# Patient Record
Sex: MALE | Race: OTHER | URBAN - METROPOLITAN AREA
[De-identification: names, ages, dates, MRNs, and addresses within clinical notes are randomized per-mention and may not be internally consistent; named-entity substitution may affect disease eponyms.]

---

## 2021-01-11 ENCOUNTER — EMERGENCY (EMERGENCY)
Facility: HOSPITAL | Age: 46
LOS: 1 days | Discharge: HOME | End: 2021-01-11
Admitting: EMERGENCY MEDICINE
Payer: COMMERCIAL

## 2021-01-11 VITALS
OXYGEN SATURATION: 98 % | HEART RATE: 60 BPM | RESPIRATION RATE: 18 BRPM | TEMPERATURE: 98 F | SYSTOLIC BLOOD PRESSURE: 127 MMHG | DIASTOLIC BLOOD PRESSURE: 75 MMHG

## 2021-01-11 DIAGNOSIS — Z20.822 CONTACT WITH AND (SUSPECTED) EXPOSURE TO COVID-19: ICD-10-CM

## 2021-01-11 PROCEDURE — U0005: CPT

## 2021-01-11 PROCEDURE — U0003: CPT

## 2021-01-11 PROCEDURE — 99283 EMERGENCY DEPT VISIT LOW MDM: CPT

## 2021-01-11 NOTE — ED PROVIDER NOTE - PATIENT PORTAL LINK FT
You can access the FollowMyHealth Patient Portal offered by [facility] by registering at the following website: [email or web address]. By joining [facility]’s FollowMyHealth portal, you will also be able to view your health information using other applications (apps) compatible with our system.

## 2021-01-12 LAB — SARS-COV-2 RNA SPEC QL NAA+PROBE: SIGNIFICANT CHANGE UP

## 2022-10-12 PROBLEM — Z00.00 ENCOUNTER FOR PREVENTIVE HEALTH EXAMINATION: Status: ACTIVE | Noted: 2022-10-12

## 2022-11-10 ENCOUNTER — NON-APPOINTMENT (OUTPATIENT)
Age: 47
End: 2022-11-10

## 2022-11-10 ENCOUNTER — APPOINTMENT (OUTPATIENT)
Dept: COLORECTAL SURGERY | Facility: CLINIC | Age: 47
End: 2022-11-10

## 2022-11-10 VITALS
HEART RATE: 83 BPM | DIASTOLIC BLOOD PRESSURE: 87 MMHG | TEMPERATURE: 97.3 F | BODY MASS INDEX: 23.8 KG/M2 | WEIGHT: 170 LBS | SYSTOLIC BLOOD PRESSURE: 130 MMHG | HEIGHT: 71 IN

## 2022-11-10 DIAGNOSIS — K64.4 RESIDUAL HEMORRHOIDAL SKIN TAGS: ICD-10-CM

## 2022-11-10 PROCEDURE — 46600 DIAGNOSTIC ANOSCOPY SPX: CPT

## 2022-11-10 PROCEDURE — 99203 OFFICE O/P NEW LOW 30 MIN: CPT | Mod: 25

## 2022-11-10 NOTE — HISTORY OF PRESENT ILLNESS
[FreeTextEntry1] : 48 y/o M presents for evaluation of hemorrhoids, referred by Dr. Aragon.\par \par PMH: Denies\par PSH: Hemorrhoid removed\par Denies [facility] Colorectal CA\par Last Colonoscopy: 9/15/22, per patient s/p polypectomy benign and some other polyp more concerning for closer followup, repeat in 5 years\par Denies ASA/NSAIDs in last 7 days.\par \par Hx rubber banding x2 10 years ago and 30 years ago. c/o of extra tissue, unable to bike for long periods of time d/t pain, occasional bleeding after BM on TP, hydrocortisone cream OTC, lidocaine, tissue prolapses, patient pushes back in but it prolapses anyway. Denies itching, burning, constipation. \par \par BH: once daily, soft and formed \par Admits to adequate intake of dietary fiber and water.\par Denies use of stool softeners and fiber supplements.\par \par \par \par

## 2022-11-10 NOTE — PHYSICAL EXAM
[FreeTextEntry1] : Medical assistant present for duration of physical examination\par \par General no acute distress, alert and oriented\par Psych calm, pleasant demeanor, responding appropriately to questions\par Nonlabored breathing\par Ambulating without assistance\par Skin normal color and pigment, no visible lesions or rashes\par \par Anorectal Exam:\par Inspection no erythema, induration or fluctuance, no skin excoriation, no fissure, external hemorrhoids left lateral and right posterior without inflammation or thrombosis\par ROSIBEL nontender, no masses palpated, no blood on gloved finger\par \par Procedure: Anoscopy\par \par Pre procedure Diagnosis: external hemorrhoids\par Post procedure Diagnosis:  external hemorrhoids\par Anesthesia: none\par Estimated blood loss: none\par Specimen: none\par Complications: none\par \par Consent obtained. Anoscopy was performed by passing a lighted anoscope with lubricant jelly into the anal canal and the entire anal mucosal surface was inspected. Findings included no fissure, external left lateral and right posterior > right anterior with associated mild internal hemorrhoids \par \par Patient tolerated examination and procedure well.\par \par \par

## 2022-11-10 NOTE — ASSESSMENT
[FreeTextEntry1] : Exam findings and diagnosis were discussed at length with patient. \par External hemorrhoids noted on exam, no significant internal hemorrhoids seen on anoscopy.\par Management options discussed, including supportive care. Witch hazel and topical creams were discussed as needed for symptoms.\par We discussed surgical hemorrhoidectomy. \par The nature of the procedure as well as risks and benefits were reviewed with the patient. Risk/benefits/alternatives discussed at length, including but not limited to pain, bleeding, infection, swelling, recurrence of symptoms, need for future surgery, scarring, and risk of alteration of bowel habits, which may be temporary or permanent. Postprocedural expectations regarding pain, wound cosmesis, and wound healing was discussed.  The preoperative, intraoperative, and postoperative management were discussed with the patient in detail. \par All questions were answered, patient expressed understanding. He would like to continue with supportive care and will contact office if he would like to pursue further intervention.\par

## 2023-08-16 NOTE — ED ADULT TRIAGE NOTE - DOMESTIC TRAVEL HIGH RISK QUESTION
MEDICATIONS  (STANDING):  atorvastatin 10 milliGRAM(s) Oral at bedtime  LORazepam     Tablet 1 milliGRAM(s) Oral three times a day  metFORMIN 1000 milliGRAM(s) Oral two times a day  risperiDONE   Tablet 2 milliGRAM(s) Oral at bedtime    MEDICATIONS  (PRN):  diphenhydrAMINE 50 milliGRAM(s) Oral every 6 hours PRN Extrapyramidal prophylaxis  diphenhydrAMINE Injectable 50 milliGRAM(s) IntraMuscular once PRN Extrapyramidal prophylaxis  haloperidol     Tablet 5 milliGRAM(s) Oral every 6 hours PRN moderate psychotic agitation  haloperidol    Injectable 5 milliGRAM(s) IntraMuscular Once PRN severe psychotic agitation  LORazepam     Tablet 2 milliGRAM(s) Oral every 6 hours PRN moderate psychotic anxiety  LORazepam   Injectable 2 milliGRAM(s) IntraMuscular Once PRN severe psychotic anxiety  traZODone 75 milliGRAM(s) Oral at bedtime PRN insomnia No